# Patient Record
Sex: FEMALE | Race: WHITE | NOT HISPANIC OR LATINO | Employment: FULL TIME | URBAN - METROPOLITAN AREA
[De-identification: names, ages, dates, MRNs, and addresses within clinical notes are randomized per-mention and may not be internally consistent; named-entity substitution may affect disease eponyms.]

---

## 2023-05-16 ENCOUNTER — OFFICE VISIT (OUTPATIENT)
Dept: PRIMARY CARE | Facility: CLINIC | Age: 53
End: 2023-05-16
Payer: COMMERCIAL

## 2023-05-16 VITALS
TEMPERATURE: 98.5 F | WEIGHT: 215.4 LBS | BODY MASS INDEX: 38.16 KG/M2 | HEIGHT: 63 IN | SYSTOLIC BLOOD PRESSURE: 144 MMHG | DIASTOLIC BLOOD PRESSURE: 92 MMHG | HEART RATE: 72 BPM

## 2023-05-16 DIAGNOSIS — Z00.00 HEALTHCARE MAINTENANCE: ICD-10-CM

## 2023-05-16 DIAGNOSIS — N95.0 POSTMENOPAUSAL BLEEDING: Primary | ICD-10-CM

## 2023-05-16 DIAGNOSIS — Z12.31 BREAST CANCER SCREENING BY MAMMOGRAM: ICD-10-CM

## 2023-05-16 DIAGNOSIS — L02.92 BOILS: ICD-10-CM

## 2023-05-16 DIAGNOSIS — E78.5 HYPERLIPIDEMIA, UNSPECIFIED HYPERLIPIDEMIA TYPE: ICD-10-CM

## 2023-05-16 DIAGNOSIS — R03.0 ELEVATED BP WITHOUT DIAGNOSIS OF HYPERTENSION: ICD-10-CM

## 2023-05-16 DIAGNOSIS — Z23 ENCOUNTER FOR VACCINATION: ICD-10-CM

## 2023-05-16 PROBLEM — F40.243 FEAR OF FLYING: Status: ACTIVE | Noted: 2023-05-16

## 2023-05-16 PROBLEM — F41.9 ANXIETY: Status: ACTIVE | Noted: 2023-05-16

## 2023-05-16 PROBLEM — N94.6 DYSMENORRHEA: Status: ACTIVE | Noted: 2023-05-16

## 2023-05-16 PROBLEM — R60.9 PAROTID SWELLING: Status: RESOLVED | Noted: 2023-05-16 | Resolved: 2023-05-16

## 2023-05-16 PROBLEM — E66.9 OBESITY: Status: ACTIVE | Noted: 2023-05-16

## 2023-05-16 LAB
NON-UH HIE A/G RATIO: 1.4
NON-UH HIE ALB: 4.2 G/DL (ref 3.4–5)
NON-UH HIE ALK PHOS: 73 UNIT/L (ref 46–116)
NON-UH HIE BILIRUBIN, TOTAL: 0.5 MG/DL (ref 0.2–1)
NON-UH HIE BUN/CREAT RATIO: 12.5
NON-UH HIE BUN: 10 MG/DL (ref 9–23)
NON-UH HIE CALCIUM: 9.5 MG/DL (ref 8.7–10.4)
NON-UH HIE CALCULATED LDL CHOLESTEROL: 185 MG/DL (ref 60–130)
NON-UH HIE CALCULATED OSMOLALITY: 282 MOSM/KG (ref 275–295)
NON-UH HIE CHLORIDE: 107 MMOL/L (ref 98–107)
NON-UH HIE CHOLESTEROL: 271 MG/DL (ref 100–200)
NON-UH HIE CO2, VENOUS: 27 MMOL/L (ref 20–31)
NON-UH HIE CREATININE: 0.8 MG/DL (ref 0.5–0.8)
NON-UH HIE GFR AA: >60
NON-UH HIE GLOBULIN: 3.1 G/DL
NON-UH HIE GLOMERULAR FILTRATION RATE: >60 ML/MIN/1.73M?
NON-UH HIE GLUCOSE: 93 MG/DL (ref 74–106)
NON-UH HIE GOT: 17 UNIT/L (ref 15–37)
NON-UH HIE GPT: 16 UNIT/L (ref 10–49)
NON-UH HIE HCT: 43.4 % (ref 36–46)
NON-UH HIE HDL CHOLESTEROL: 71 MG/DL (ref 40–60)
NON-UH HIE HGB A1C: 5.1 %
NON-UH HIE HGB: 14.3 G/DL (ref 12–16)
NON-UH HIE INSTR WBC ND: 6.4
NON-UH HIE K: 4.1 MMOL/L (ref 3.5–5.1)
NON-UH HIE MCH: 29.3 PG (ref 27–34)
NON-UH HIE MCHC: 32.8 G/DL (ref 32–37)
NON-UH HIE MCV: 89.3 FL (ref 80–100)
NON-UH HIE MPV: 9.1 FL (ref 7.4–10.4)
NON-UH HIE NA: 142 MMOL/L (ref 135–145)
NON-UH HIE PLATELET: 242 X10 (ref 150–450)
NON-UH HIE RBC: 4.86 X10 (ref 4.2–5.4)
NON-UH HIE RDW: 15 % (ref 11.5–14.5)
NON-UH HIE TOTAL CHOL/HDL CHOL RATIO: 3.8
NON-UH HIE TOTAL PROTEIN: 7.3 G/DL (ref 5.7–8.2)
NON-UH HIE TRIGLYCERIDES: 75 MG/DL (ref 30–150)
NON-UH HIE TSH: 3.1 UIU/ML (ref 0.55–4.78)
NON-UH HIE VIT D 25: 24 NG/ML
NON-UH HIE WBC: 6.4 X10 (ref 4.5–11)

## 2023-05-16 PROCEDURE — 99204 OFFICE O/P NEW MOD 45 MIN: CPT | Performed by: FAMILY MEDICINE

## 2023-05-16 PROCEDURE — 90472 IMMUNIZATION ADMIN EACH ADD: CPT | Performed by: FAMILY MEDICINE

## 2023-05-16 PROCEDURE — 90471 IMMUNIZATION ADMIN: CPT | Performed by: FAMILY MEDICINE

## 2023-05-16 PROCEDURE — 90750 HZV VACC RECOMBINANT IM: CPT | Performed by: FAMILY MEDICINE

## 2023-05-16 PROCEDURE — 90715 TDAP VACCINE 7 YRS/> IM: CPT | Performed by: FAMILY MEDICINE

## 2023-05-16 PROCEDURE — 1036F TOBACCO NON-USER: CPT | Performed by: FAMILY MEDICINE

## 2023-05-16 RX ORDER — MUPIROCIN 20 MG/G
OINTMENT TOPICAL 3 TIMES DAILY
Qty: 22 G | Refills: 0 | Status: SHIPPED | OUTPATIENT
Start: 2023-05-16 | End: 2023-05-26

## 2023-05-16 ASSESSMENT — PATIENT HEALTH QUESTIONNAIRE - PHQ9
2. FEELING DOWN, DEPRESSED OR HOPELESS: NOT AT ALL
SUM OF ALL RESPONSES TO PHQ9 QUESTIONS 1 AND 2: 0
1. LITTLE INTEREST OR PLEASURE IN DOING THINGS: NOT AT ALL

## 2023-05-16 NOTE — PROGRESS NOTES
"Subjective   Patient ID: Olga Quesada is a 53 y.o. female who presents for Follow-up (Patient stated she has her last regular period Oct 2020, but last month had a full period.  No pelvic pain, just slight cramping. ).    HPI     52 yo female presents co postmenopausal bleeding  Was last seen 4/2020    LMP 10/2020;  and then had one 1 mo ago- lasted 5 days- mod flow; no clots. Some cramping.   Now started with light spotting again today and cramping    was seeing gyn in past but [facility]- but hasnt [facility] one since prob 2019?  last mammo yrs ago - no breast changes     flu shot-defers  tetanus about 2012 per pt- will get today  Shingrix- will get    hx HLD- last lipids 1/2019 were slightly elevated;   BMI 38  +stress eating  BP is up today- has cuff at home but hasnt been checking    She denies any chest pains, palpitations, shortness of breath, abdominal pains, reflux, nausea, vomiting, diarrhea, constipation, blood in stool, melena.     Hx boils on and off- requests refill on  mupirocin crm  Hx of Psorisas sees derm- uses topical on scalp    Hasnt had screening colonoscopy    Review of Systems  ROS as stated in HPI    Objective   BP (!) 144/92   Pulse 72   Temp 36.9 °C (98.5 °F)   Ht 1.6 m (5' 3\")   Wt 97.7 kg (215 lb 6.4 oz)   BMI 38.16 kg/m²     Physical Exam  Vitals and nursing note reviewed.   Constitutional:       Appearance: Normal appearance. She is normal weight.   HENT:      Head: Normocephalic and atraumatic.      Right Ear: Tympanic membrane, ear canal and external ear normal.      Left Ear: Tympanic membrane, ear canal and external ear normal.      Nose: Nose normal.      Mouth/Throat:      Mouth: Mucous membranes are moist.      Pharynx: Oropharynx is clear.   Eyes:      Extraocular Movements: Extraocular movements intact.      Conjunctiva/sclera: Conjunctivae normal.      Pupils: Pupils are equal, round, and reactive to light.   Cardiovascular:      Rate and " Rhythm: Normal rate and regular rhythm.   Pulmonary:      Effort: Pulmonary effort is normal.      Breath sounds: Normal breath sounds.   Abdominal:      General: Abdomen is flat. Bowel sounds are normal.      Palpations: Abdomen is soft.   Musculoskeletal:         General: Normal range of motion.      Cervical back: Neck supple.   Skin:     General: Skin is warm and dry.   Neurological:      General: No focal deficit present.      Mental Status: She is alert and oriented to person, place, and time.   Psychiatric:         Mood and Affect: Mood normal.         Behavior: Behavior normal.         Thought Content: Thought content normal.         Judgment: Judgment normal.         Assessment/Plan   Problem List Items Addressed This Visit          Circulatory    Elevated BP without diagnosis of hypertension       Other    HLD (hyperlipidemia)     Other Visit Diagnoses       Postmenopausal bleeding    -  Primary    Relevant Orders    US pelvis    US pelvis transvaginal    Referral to Obstetrics / Gynecology    Breast cancer screening by mammogram        Relevant Orders    BI mammo bilateral screening tomosynthesis    Healthcare maintenance        Relevant Orders    CBC    Comprehensive Metabolic Panel    Hemoglobin A1C    Lipid Panel    TSH with reflex to Free T4 if abnormal    Vitamin D, Total    Boils        Relevant Medications    mupirocin (Bactroban) 2 % ointment    Encounter for vaccination        Relevant Orders    Tdap vaccine, age 10 years and older  (BOOSTRIX) (Completed)    Zoster vaccine, recombinant, adult (SHINGRIX) (Completed)          Check pelvic US and refer to gyn  Check fasting labs  Monitor BP at home  Healthy lifestyle  Check mammo  Recommend screening colonoscopy- fu with GI #s given   Tdap and shinbrix #1 given today; fu 3 mo for #2 and to recheck BP  Rx refill mupirocin

## 2023-05-19 ENCOUNTER — TELEPHONE (OUTPATIENT)
Dept: PRIMARY CARE | Facility: CLINIC | Age: 53
End: 2023-05-19
Payer: COMMERCIAL

## 2023-05-19 NOTE — TELEPHONE ENCOUNTER
----- Message from Olga Puga DO sent at 5/18/2023  8:39 PM EDT -----  Please let pt know that their blood counts, sugar, electrolytes, thyroid, liver, and kidney function are all normal. Her cholesterol was elevated.   I recommend a healthy diet and exercise and we can recheck again in a few mo.   Her vitamin D level is low at 24(should be above 30).  I recommend they  take at least 2000 units of an OTC vitamin D supplement daily and we will cont to monitor.

## 2023-08-21 ENCOUNTER — APPOINTMENT (OUTPATIENT)
Dept: PRIMARY CARE | Facility: CLINIC | Age: 53
End: 2023-08-21
Payer: COMMERCIAL

## 2023-08-28 ENCOUNTER — APPOINTMENT (OUTPATIENT)
Dept: PRIMARY CARE | Facility: CLINIC | Age: 53
End: 2023-08-28
Payer: COMMERCIAL

## 2023-08-31 ENCOUNTER — APPOINTMENT (OUTPATIENT)
Dept: PRIMARY CARE | Facility: CLINIC | Age: 53
End: 2023-08-31
Payer: COMMERCIAL

## 2023-10-03 ENCOUNTER — OFFICE VISIT (OUTPATIENT)
Dept: PRIMARY CARE | Facility: CLINIC | Age: 53
End: 2023-10-03
Payer: COMMERCIAL

## 2023-10-03 VITALS
SYSTOLIC BLOOD PRESSURE: 148 MMHG | HEART RATE: 78 BPM | TEMPERATURE: 97.3 F | HEIGHT: 63 IN | WEIGHT: 227 LBS | BODY MASS INDEX: 40.22 KG/M2 | DIASTOLIC BLOOD PRESSURE: 88 MMHG

## 2023-10-03 DIAGNOSIS — Z23 ENCOUNTER FOR IMMUNIZATION: ICD-10-CM

## 2023-10-03 DIAGNOSIS — R03.0 ELEVATED BP WITHOUT DIAGNOSIS OF HYPERTENSION: ICD-10-CM

## 2023-10-03 DIAGNOSIS — I10 HYPERTENSION, UNSPECIFIED TYPE: Primary | ICD-10-CM

## 2023-10-03 DIAGNOSIS — R53.83 FATIGUE, UNSPECIFIED TYPE: ICD-10-CM

## 2023-10-03 DIAGNOSIS — E55.9 VITAMIN D DEFICIENCY: ICD-10-CM

## 2023-10-03 DIAGNOSIS — E78.5 HYPERLIPIDEMIA, UNSPECIFIED HYPERLIPIDEMIA TYPE: ICD-10-CM

## 2023-10-03 PROCEDURE — 90471 IMMUNIZATION ADMIN: CPT | Performed by: FAMILY MEDICINE

## 2023-10-03 PROCEDURE — 1036F TOBACCO NON-USER: CPT | Performed by: FAMILY MEDICINE

## 2023-10-03 PROCEDURE — 90472 IMMUNIZATION ADMIN EACH ADD: CPT | Performed by: FAMILY MEDICINE

## 2023-10-03 PROCEDURE — 3077F SYST BP >= 140 MM HG: CPT | Performed by: FAMILY MEDICINE

## 2023-10-03 PROCEDURE — 3079F DIAST BP 80-89 MM HG: CPT | Performed by: FAMILY MEDICINE

## 2023-10-03 PROCEDURE — 99214 OFFICE O/P EST MOD 30 MIN: CPT | Performed by: FAMILY MEDICINE

## 2023-10-03 PROCEDURE — 90750 HZV VACC RECOMBINANT IM: CPT | Performed by: FAMILY MEDICINE

## 2023-10-03 PROCEDURE — 90686 IIV4 VACC NO PRSV 0.5 ML IM: CPT | Performed by: FAMILY MEDICINE

## 2023-10-03 RX ORDER — LOSARTAN POTASSIUM 50 MG/1
50 TABLET ORAL DAILY
Qty: 30 TABLET | Refills: 2 | Status: SHIPPED | OUTPATIENT
Start: 2023-10-03 | End: 2023-10-26

## 2023-10-03 ASSESSMENT — PATIENT HEALTH QUESTIONNAIRE - PHQ9
1. LITTLE INTEREST OR PLEASURE IN DOING THINGS: NOT AT ALL
SUM OF ALL RESPONSES TO PHQ9 QUESTIONS 1 AND 2: 0
2. FEELING DOWN, DEPRESSED OR HOPELESS: NOT AT ALL

## 2023-10-03 NOTE — PROGRESS NOTES
"Subjective   Patient ID: Olga Quseada is a 53 y.o. female who presents for Hypertension (Shingrix #2 and flu shot today. ).    HPI     52 yo female presents for fu    Was seen in may for postmenopausal bleeding  Saw gyn dr osorio and had neg pap and endo biopsy; did not have an US  Has not had any bleeding since.    Mammo scheduled for [facility]    BP at home has been 150s/90s   Open to med     Lab in may lipids up vit D 24   Co trouble losing wt    flu shot-will get today  Tdap 2023  Shingrix- will get #2 today     hx HLD-BMI 40     She denies any chest pains, palpitations, shortness of breath, abdominal pains, reflux, nausea, vomiting, diarrhea, constipation, blood in stool, melena.         Hasnt had screening colonoscopy yet- plans to fu with GI vollweiler  No bowel changes.    Co fatigue; wakes up in night but usually to urinate and then mind races  Doesn't know if she snores. No obvious apnea    +stress; was on lexapro yrs ago    Review of Systems  ROS as stated in HPI    Objective   /88   Pulse 78   Temp 36.3 °C (97.3 °F)   Ht 1.6 m (5' 3\")   Wt 103 kg (227 lb)   BMI 40.21 kg/m²     Physical Exam     Constitutional: A&O; NAD  HEENT: conjunctiva normal. EOMI; PERRLA; lids normal; TM's clear;   Neck: supple; No lymphadenopathy   Heart: RRR     Lungs: CTA bilateral   Abd: Soft, Nontender, Nondistended  Ext:  No edema;    Neuro: No gross neuro deficits     Psych: Judgment, orientation, mood, affect, insight wnl   Assessment/Plan   Problem List Items Addressed This Visit             ICD-10-CM    HLD (hyperlipidemia) E78.5    Relevant Orders    Comprehensive Metabolic Panel    Lipid Panel    Elevated BP without diagnosis of hypertension R03.0    Relevant Orders    Comprehensive Metabolic Panel     Other Visit Diagnoses         Codes    Hypertension, unspecified type    -  Primary I10    Relevant Medications    losartan (Cozaar) 50 mg tablet    Encounter for immunization     Z23    Relevant Orders    Flu " vaccine (IIV4) age 6 months and greater, preservative free (Completed)    Zoster vaccine, recombinant, adult (SHINGRIX) (Completed)    Fatigue, unspecified type     R53.83    Relevant Orders    TSH with reflex to Free T4 if abnormal    Cortisol    Vitamin D deficiency     E55.9    Relevant Orders    Vitamin D 25-Hydroxy,Total (for eval of Vitamin D levels)            Obtain gyn notes/results  reCheck fasting labs  Start rx losartan 50mg  Cont to Monitor BP at home  Healthy lifestyle  Check mammo-appt anuel  Refer to Comprehensive  wt loss program  Conside sleep med re: fatigue  Recommend screening colonoscopy- fu with GI   Flu shot  and shinbrix #2 given today;   Fu  to recheck BP in a month  Consider lexapro

## 2023-10-26 DIAGNOSIS — I10 HYPERTENSION, UNSPECIFIED TYPE: ICD-10-CM

## 2023-10-26 RX ORDER — LOSARTAN POTASSIUM 50 MG/1
50 TABLET ORAL DAILY
Qty: 90 TABLET | Refills: 1 | Status: SHIPPED | OUTPATIENT
Start: 2023-10-26

## 2024-09-13 ENCOUNTER — HOSPITAL ENCOUNTER (OUTPATIENT)
Dept: RADIOLOGY | Facility: CLINIC | Age: 54
Discharge: HOME | End: 2024-09-13
Payer: COMMERCIAL

## 2024-09-13 VITALS — WEIGHT: 227 LBS | BODY MASS INDEX: 40.22 KG/M2 | HEIGHT: 63 IN

## 2024-09-13 DIAGNOSIS — Z12.31 SCREENING MAMMOGRAM FOR BREAST CANCER: ICD-10-CM

## 2024-09-13 PROCEDURE — 77067 SCR MAMMO BI INCL CAD: CPT

## 2024-09-17 ENCOUNTER — TELEPHONE (OUTPATIENT)
Dept: PRIMARY CARE | Facility: CLINIC | Age: 54
End: 2024-09-17
Payer: COMMERCIAL

## 2024-09-17 DIAGNOSIS — N64.89 BREAST ASYMMETRY: ICD-10-CM

## 2024-09-17 NOTE — TELEPHONE ENCOUNTER
----- Message from Olga Puga sent at 9/17/2024  8:56 AM EDT -----  Please let pt know her mammogram shows a slight asymmetry in her right breast and the radiologist would like to get some additional views to get a better look.  please enter order for diagnostic mammogram of right breast and US dx: right breast asymmetry seen on screening mammo to be done at

## 2024-09-24 ENCOUNTER — APPOINTMENT (OUTPATIENT)
Dept: RADIOLOGY | Facility: CLINIC | Age: 54
End: 2024-09-24
Payer: COMMERCIAL

## 2024-09-30 ENCOUNTER — TELEPHONE (OUTPATIENT)
Dept: PRIMARY CARE | Facility: CLINIC | Age: 54
End: 2024-09-30
Payer: COMMERCIAL

## 2024-09-30 ENCOUNTER — HOSPITAL ENCOUNTER (OUTPATIENT)
Dept: RADIOLOGY | Facility: CLINIC | Age: 54
Discharge: HOME | End: 2024-09-30
Payer: COMMERCIAL

## 2024-09-30 DIAGNOSIS — N64.89 BREAST ASYMMETRY: ICD-10-CM

## 2024-09-30 PROCEDURE — 77061 BREAST TOMOSYNTHESIS UNI: CPT | Mod: RT

## 2024-09-30 PROCEDURE — 76642 ULTRASOUND BREAST LIMITED: CPT | Mod: RT

## 2024-09-30 PROCEDURE — 77061 BREAST TOMOSYNTHESIS UNI: CPT | Mod: RIGHT SIDE | Performed by: RADIOLOGY

## 2024-09-30 PROCEDURE — 77065 DX MAMMO INCL CAD UNI: CPT | Mod: RIGHT SIDE | Performed by: RADIOLOGY

## 2024-09-30 PROCEDURE — 76642 ULTRASOUND BREAST LIMITED: CPT | Mod: RIGHT SIDE | Performed by: RADIOLOGY

## 2024-09-30 PROCEDURE — 76981 USE PARENCHYMA: CPT | Mod: RT

## 2024-09-30 NOTE — TELEPHONE ENCOUNTER
----- Message from Olga Puga sent at 9/30/2024  1:33 PM EDT -----  Please let patient know her additional mammogram views appear benign but the radiologist is recommending a repeat mammogram in 6-month on the right side to make sure.    Please enter order for diagnostic right mammogram to be done in 6 months dx follow-up right breast asymmetry

## 2024-11-12 DIAGNOSIS — I10 HYPERTENSION, UNSPECIFIED TYPE: ICD-10-CM

## 2024-11-12 RX ORDER — LOSARTAN POTASSIUM 50 MG/1
50 TABLET ORAL DAILY
Qty: 90 TABLET | Refills: 1 | Status: SHIPPED | OUTPATIENT
Start: 2024-11-12

## 2025-03-31 ENCOUNTER — APPOINTMENT (OUTPATIENT)
Dept: RADIOLOGY | Facility: CLINIC | Age: 55
End: 2025-03-31
Payer: COMMERCIAL